# Patient Record
Sex: MALE | Race: WHITE | NOT HISPANIC OR LATINO | URBAN - METROPOLITAN AREA
[De-identification: names, ages, dates, MRNs, and addresses within clinical notes are randomized per-mention and may not be internally consistent; named-entity substitution may affect disease eponyms.]

---

## 2017-06-30 NOTE — PATIENT DISCUSSION
okay to shoot pool, 6m-1year to reach Max [place] potential.   Still no strenuous activity and no swimming x 1 week.  Keep bracelt on till bubble gone.  No flying with bubble.  If driving be jose rafael careful due to depth perception off.

## 2017-07-14 NOTE — PATIENT DISCUSSION
okay to go on [address] but pt aware of risk of redetatchment with recent rd, no sign of PVR right now.   No new RT/RD.

## 2017-07-14 NOTE — PATIENT DISCUSSION
okay to shoot pool, 6m-1year to reach Max [place] potential.   okay to resume normal activity but avoid eye trauma.  Bubble gone. If driving be very careful due to depth perception off.

## 2017-08-01 NOTE — PATIENT DISCUSSION
Stressed most VA improvement first 2 months, 6m-1yr to reach maximum VA potential. [place] will never be what it was prior to detachment. Ok to swim now. Any position ok now bubble gone.

## 2017-08-01 NOTE — PATIENT DISCUSSION
okay to go on [address] but pt aware of risk of redetatchment with recent rd, no sign of PVR right now.  No scar tissue seen on exam.

## 2017-10-17 NOTE — PATIENT DISCUSSION
"rec follow with [address] [facility], pt concerned about IOP states ""too high for him"". "

## 2017-10-25 ENCOUNTER — IMPORTED ENCOUNTER (OUTPATIENT)
Dept: URBAN - METROPOLITAN AREA CLINIC 9 | Facility: CLINIC | Age: 72
End: 2017-10-25

## 2017-10-30 NOTE — PATIENT DISCUSSION
"rec follow with [address] [facility], pt concerned about IOP states ""too high for him"". "

## 2017-11-14 NOTE — PATIENT DISCUSSION
See #2, pt IOP alittle high form advanced glc, rec see [address] [facility].

## 2017-11-14 NOTE — PATIENT DISCUSSION
"rec follow with [address] [facility], pt concerned about IOP states ""too high for him"". "

## 2017-11-22 NOTE — PROCEDURE NOTE: SURGICAL
Prior to commencing surgery patient identification, surgical procedure, site, and side were confirmed by Dr. Dara Roman. Following topical proparacaine anesthesia, the patient was positioned at the YAG laser, a contact lens coupled to the cornea with methylcellulose and an axial posterior capsulotomy performed without complication using 2.9 Mj x 36. Excess methylcellulose was washed from the eye, one drop of Alphagan was instilled and the patient returned to the holding area having tolerated the procedure well and without complication. <br />

## 2018-01-29 NOTE — PATIENT DISCUSSION
Pt having issues with getting Travatan. Recc switching to Latanoprost QHS OU, written Rx given to patient and follow up with Dr. Elisa Murillo as scheduled for IOP check.

## 2018-01-29 NOTE — PATIENT DISCUSSION
Stressed most VA improvement first 2 months, 6m-1yr to reach maximum VA potential. [place] will never be what it was prior to detachment.

## 2018-01-29 NOTE — PATIENT DISCUSSION
Pt edu stable. No change from previous visit. Recc continue with Preservision AREDS 2. Will follow up in 6 months. Call with any VA changes.

## 2018-02-13 NOTE — PATIENT DISCUSSION
Stressed most VA improvement first 2 months, 6m-1yr to reach maximum VA potential. [address] will never be what it was prior to detachment.

## 2018-07-23 NOTE — PATIENT DISCUSSION
Stable, no new RT/RD's seen on exam. S/S of RT/RD discussed in detail. Advised to call with any vision changes.

## 2018-07-23 NOTE — PATIENT DISCUSSION
Pressure slightly elevated in OS,need to keep IOP on the lower end, recommend seeing [address] this week and not waiting till Sept.

## 2018-07-27 NOTE — PATIENT DISCUSSION
IOP good on exam today. IOP is fluctuating. Recommend adding Rhopressa qhs OU. Continue Simbrinza and Cynthia Corby.

## 2018-08-24 NOTE — PATIENT DISCUSSION
Discussed trab has healed and not getting effect from the surgery. IOP is too high. Recommend removing scar tissue OS to reduce pressure. Discussed risks and benefits of surgery. Discussed no heavy lifting after surgery. Pt chooses to proceed with surgery.

## 2018-08-24 NOTE — PATIENT DISCUSSION
** 08/24/2018 secondary to insurance coverage pt will be using Ciprofloxacin vs Vigamox. Sent new script to [facility] in [address]

## 2018-09-10 NOTE — PATIENT DISCUSSION
HOLD SIMBRINZA AND VYZULTA IN OS. CONTINUE DUREZOL AND CIPROFLOXACIN IN OS AS INSTRUCTED. NO EYE RUBBING, SHIELD QHS, 24-7 E.R DOCTOR.

## 2018-10-25 ENCOUNTER — IMPORTED ENCOUNTER (OUTPATIENT)
Dept: URBAN - METROPOLITAN AREA CLINIC 9 | Facility: CLINIC | Age: 73
End: 2018-10-25

## 2019-05-17 NOTE — PATIENT DISCUSSION
Recommended artificial tears and warm compresses. AT gel Qhs for 3 nights and AT Q1-2 hours for 2 days.

## 2019-06-17 NOTE — PATIENT DISCUSSION
eyelid inflammation and CAREY likely cause of symptoms.  rec A.T use and W/C f/u with Optometry in 1-2 weeks.

## 2019-09-16 NOTE — PATIENT DISCUSSION
Pt states he hit his eye when putting on his readers.  Advised no abrasion noted, rec pres. Free A.T. throughout the day today, should feel better tomorrow if not see and O. D.

## 2019-12-06 NOTE — PATIENT DISCUSSION
IOP ABOVE TARGET OD AND PT BECOMING INTOLERANT TO GTTS OPTION OF TRYING SLT BUT MOST LIKELY WILL NEED GLC SX . WILL SCHEDULE SLT OD.

## 2019-12-12 NOTE — PROCEDURE NOTE: CLINICAL
PROCEDURE NOTE: SLT OD. Diagnosis: POAG, Severe. Prior to treatment, risks/benefits/alternatives discussed including infection, loss of vision, hemorrhage, cataract, glaucoma, retinal tears or detachment. Lens:  SLT laser lens with goniosol. Power: 1.4mJ. Total applications: 646. Application 380 degrees. Patient tolerated procedure well. There were no complications. Post-op instructions given. Post-op IOP = * mmHg. Neftaly Torres

## 2019-12-12 NOTE — PATIENT DISCUSSION
IOP ABOVE TARGET OD AND PT BECOMING INTOLERANT TO GTTS OPTION OF TRYING SLT BUT MOST LIKELY WILL NEED GLC SX . PLAN: SLT OD TODAY. OK TO SEE [facility] FOR IOP AFTER SLT SINCE PT ALREADY HAS AN APPT.

## 2020-01-20 NOTE — PATIENT DISCUSSION
Doing well, no RT/RD seen on exam. Advised pt that will have some visual disturbances due to Mac Off RD.

## 2020-01-20 NOTE — PATIENT DISCUSSION
IOP still slightly elevated OD since SLT. Recommend f/u with [address] [facility] in 1-2 months since better seeing eye. Continue with present meds as directed.

## 2020-03-06 NOTE — PATIENT DISCUSSION
IOP better OD.  Discussed likely will need glc surgery, Xen vs Trab, in the future OD.  Pt wishes to defer at this time.  Will continue glc gtts and monitor.

## 2020-06-12 NOTE — PATIENT DISCUSSION
If pt decides to get cortisone shots vs gel with PCP he will call and schedule an IOP check with a CFS OD for a month after getting injection.

## 2020-09-24 ENCOUNTER — PREPPED CHART (OUTPATIENT)
Dept: URBAN - METROPOLITAN AREA CLINIC 17 | Facility: CLINIC | Age: 75
End: 2020-09-24

## 2020-09-24 ENCOUNTER — IMPORTED ENCOUNTER (OUTPATIENT)
Dept: URBAN - METROPOLITAN AREA CLINIC 9 | Facility: CLINIC | Age: 75
End: 2020-09-24

## 2020-09-24 PROBLEM — E11.9: Noted: 2020-09-24

## 2020-09-24 PROBLEM — H25.13: Noted: 2020-09-24

## 2020-09-24 PROBLEM — D31.32: Noted: 2020-09-24

## 2020-09-24 PROBLEM — H43.811: Noted: 2020-09-24

## 2020-10-16 NOTE — PATIENT DISCUSSION
DISC PHOTOS LIKELY STABLE OU.  IOP FLUCTUATING LAST FEW VISITS.  WILL PLAN HVF EVERY OTHER VISIT.  DISCUSSED POSSIBILE NEED FOR GLC SURGERY OD IN THE FUTURE.

## 2021-02-19 NOTE — PATIENT DISCUSSION
[facility] 10-2 WHITE NEW BASELINE TODAY.  DISCUSSED MAY NEED ADDITIONAL GLC SURGERY IN THE FUTURE.  CURRENTLY WORKING ON GETTING APPROVAL FOR RHOPRESSA.  CONTINUE CURRENT MANAGEMENT.

## 2021-07-26 NOTE — PATIENT DISCUSSION
Doing well, no new RT/RD seen on exam. Advised pt that will have some visual disturbances due to Mac Off RD.

## 2021-07-26 NOTE — PATIENT DISCUSSION
AMD remains persistent but without progression. Continue with Amsler grid and consider AREDS 2 if progresses. Avoid direct or indirect smoking. The possibility of progression was discussed.

## 2021-10-18 ASSESSMENT — VISUAL ACUITY
OD_SC: 20/20 - SN
OS_SC: 20/20 SN
OS_SC: 20/20 - SN
OD_SC: 20/20 - SN
OS_SC: 20/20 SN
OD_SC: 20/20 - SN

## 2021-10-18 ASSESSMENT — TONOMETRY
OS_IOP_MMHG: 18
OS_IOP_MMHG: 16
OD_IOP_MMHG: 18
OD_IOP_MMHG: 13
OD_IOP_MMHG: 20
OS_IOP_MMHG: 20

## 2021-10-19 ASSESSMENT — TONOMETRY
OD_IOP_MMHG: 13
OS_IOP_MMHG: 16

## 2021-10-19 ASSESSMENT — VISUAL ACUITY
OD_SC: 20/20-1
OS_SC: 20/20

## 2021-10-21 ENCOUNTER — ESTABLISHED PATIENT (OUTPATIENT)
Dept: URBAN - METROPOLITAN AREA CLINIC 17 | Facility: CLINIC | Age: 76
End: 2021-10-21

## 2021-10-21 DIAGNOSIS — D31.32: ICD-10-CM

## 2021-10-21 DIAGNOSIS — H43.811: ICD-10-CM

## 2021-10-21 PROCEDURE — 92014 COMPRE OPH EXAM EST PT 1/>: CPT

## 2021-10-21 PROCEDURE — 92134 CPTRZ OPH DX IMG PST SGM RTA: CPT

## 2021-10-21 PROCEDURE — 92250 FUNDUS PHOTOGRAPHY W/I&R: CPT

## 2021-10-21 ASSESSMENT — TONOMETRY
OS_IOP_MMHG: 15
OD_IOP_MMHG: 16

## 2021-10-21 ASSESSMENT — VISUAL ACUITY
OS_SC: 20/20
OD_SC: 20/25

## 2022-06-24 RX ORDER — GABAPENTIN 300 MG/1
1 CAPSULE ORAL
COMMUNITY

## 2022-06-24 RX ORDER — LOSARTAN POTASSIUM 100 MG/1
TABLET ORAL
COMMUNITY

## 2022-06-24 RX ORDER — ATORVASTATIN CALCIUM 40 MG/1
TABLET, FILM COATED ORAL
COMMUNITY

## 2022-06-24 RX ORDER — METOLAZONE 2.5 MG/1
TABLET ORAL
COMMUNITY

## 2022-06-24 RX ORDER — GLIMEPIRIDE 1 MG/1
TABLET ORAL
COMMUNITY

## 2022-06-24 RX ORDER — METOPROLOL TARTRATE 100 MG/1
TABLET ORAL
COMMUNITY

## 2022-06-24 RX ORDER — LEVOTHYROXINE SODIUM 0.1 MG/1
TABLET ORAL
COMMUNITY

## 2022-06-24 RX ORDER — FUROSEMIDE 40 MG/1
TABLET ORAL
COMMUNITY

## 2022-06-24 RX ORDER — POTASSIUM CHLORIDE 20 MEQ/1
TABLET, EXTENDED RELEASE ORAL
COMMUNITY

## 2022-08-01 NOTE — PATIENT DISCUSSION
Doing well, no new RT/RD seen on exam. LOW VISION SECONDARY TO ADVANCED GLAUCOMA. Advised pt that will have some visual disturbances due to Mac Off RD.

## 2022-08-01 NOTE — PATIENT DISCUSSION
DISCUSSED THE DX, IMAGES, PROGNOSIS AND TX PLAN. PT REQUIRES MONITORING, RISK OF VI: MOD/HIGH. ALL QUESTIONS WERE ANSWERED.

## 2022-08-01 NOTE — PATIENT DISCUSSION
Continue with Amsler grid and consider AREDS 2 if progresses. Avoid direct or indirect smoking. The possibility of progression was discussed.

## 2022-08-26 NOTE — PATIENT DISCUSSION
WILL REPEAT 10-2 VF OD ONLY WITH IOP CHECK OU. WILL SCHEDULE AN AFTERNOON APPT. IF STILL WORSENING TO DISCUSS OPTIONS.

## 2022-10-12 NOTE — PATIENT DISCUSSION
GOOD POSTOPERATIVE APPEARANCE.  CONTINUE PRED AND MOXI QID.  (OK PER JWK TO USE PRED VS DUREZOL AS STEROID RESPONDER AND KELOID FORMER).  RESTART SIMBRINZA BID OD.  NO EYE RUBBING, SHIELD QHS.  BCL PLACED.

## 2022-10-20 NOTE — PATIENT DISCUSSION
GOOD POSTOPERATIVE APPEARANCE.  CONTINUE PRED AND MOXI QID.  (OK PER JWK TO USE PRED VS DUREZOL AS STEROID RESPONDER AND KELOID FORMER). ADD VYZULTA QHS OD.  NO EYE RUBBING, SHIELD QHS.  BCL PLACED.

## 2022-10-20 NOTE — PATIENT DISCUSSION
In one week pt will return for +/- LSL.  Add Vyzulta OD qhs until next visit. No simbrinza AM of next appt no vyzulta qhs the night before next appt.

## 2022-10-27 NOTE — PROCEDURE NOTE: CLINICAL
PROCEDURE NOTE: Laser Suture Lysis #1 OD. Diagnosis: POAG, Severe. Prior to treatment, risks/benefits/alternatives discussed including infection, loss of vision, hemorrhage, cataract, glaucoma, retinal tears or detachment. Lens used: [place]. Pulse power: 500-700 mW. Spot size: 50 um. Duration: 0.01 sec. There were no complications. Patient tolerated procedure well. Post procedure instructions given. Makayla Vizcarra

## 2022-11-04 NOTE — PATIENT DISCUSSION
IOP TOO HIGH OD TODAY.  TOO SOON FOR LSL #2. START ALPHAGAN BID OD.  ADVISED NOT TO USE DAY OF NEXT APPT.

## 2022-11-17 NOTE — PATIENT DISCUSSION
IOP TOO HIGH OD TODAY.  TOO SOON FOR LSL # 2. HOLD ALPHAGAN, USE SIMBRINZA OD BID. CONTINUE PRED QID AND MOXI TID. BCL REPLACED. NO EYE RUBBING, SHIELD QHS. 24-7 E.R DOCTOR.

## 2022-12-01 NOTE — PROCEDURE NOTE: CLINICAL
PROCEDURE NOTE: Laser Suture Lysis #2 OD. Diagnosis: POAG, Severe. Prior to treatment, risks/benefits/alternatives discussed including infection, loss of vision, hemorrhage, cataract, glaucoma, retinal tears or detachment. Lens used: [place]. Pulse power: 500-700 mW. Spot size: 50 um. Duration: 0.01 sec. There were no complications. Patient tolerated procedure well. Post procedure instructions given. Sean Tavares PROCEDURE NOTE: Bleb Needling OD. Diagnosis: POAG, Severe. Prior to treatment, risks/benefits/alternatives discussed including infection, loss of vision, hemorrhage, cataract, glaucoma, retinal tears or detachment. The patient was prepped in the usual sterile fashion. A lid speculum was placed in the operative eye. Topical Tertacaine and subconjuctival Lidocaine were administered. A 27 gauge needle was used to break up subconjunctival scar tissue and to reform the bleb. The wound was inspected and found to be water-tight. Patient tolerated procedure well. There were no complications. Post procedure instructions given. Sean Tavares

## 2022-12-01 NOTE — PATIENT DISCUSSION
IOP TOO HIGH.  REC:  LSL #2 TODAY.  ALSO DID MASSAGE AND TCNR.  TA AFTER = 06.  REFLUX BLOOD AFTER LIFTING FLAP AND REMOVING SCAR TISSUE.  ADVISED VISION WILL BE BLURRED AND DECREASED WHILE BLOOD ABSORBS.  STRESSED NO HEAVY LIFTING, WEAR SHIELD QHS, NO EYE RUBBING/SQUEEZING.  2 GTTS ATROPINE GIVEN IN OFC TODAY.  HOLD SIMBRINZA AND MOXI.   DECREASE PRED TO BID AND START ATROPINE BID.

## 2022-12-01 NOTE — PROCEDURE NOTE: CLINICAL
PROCEDURE NOTE: Laser Suture Lysis #2 OD. Diagnosis: POAG, Severe. Prior to treatment, risks/benefits/alternatives discussed including infection, loss of vision, hemorrhage, cataract, glaucoma, retinal tears or detachment. Lens used: [place]. Pulse power: 500-700 mW. Spot size: 50 um. Duration: 0.01 sec. There were no complications. Patient tolerated procedure well. Post procedure instructions given. Sean Tavares PROCEDURE NOTE: Bleb Needling OD. Diagnosis: POAG, Severe. Prior to treatment, risks/benefits/alternatives discussed including infection, loss of vision, hemorrhage, cataract, glaucoma, retinal tears or detachment. The patient was prepped in the usual sterile fashion. A lid speculum was placed in the operative eye. Topical Tertacaine and subconjuctival Lidocaine were administered. A 27 gauge needle was used to break up subconjunctival scar tissue and to reform the bleb. The wound was inspected and found to be water-tight. Patient tolerated procedure well. There were no complications. Post procedure instructions given. Sean Tavares

## 2022-12-08 NOTE — PATIENT DISCUSSION
Decrease  Pred Acetate to bid OD. Stressed importance of physical limitations which were reviewed and no eye rubbing. Pt understands that the swelling  will take some time to resolve. If pt experiences further pain he is to call asap.

## 2022-12-22 NOTE — PATIENT DISCUSSION
CHOROIDALS RESOLVED OD.  HOLD ATROPINE.  CONTINUE PRED BID.  NO EYE RUBBING. What Type Of Note Output Would You Prefer (Optional)?: Standard Output How Severe Is Your Skin Lesion?: mild Has Your Skin Lesion Been Treated?: not been treated Is This A New Presentation, Or A Follow-Up?: Skin Lesion

## 2023-12-30 NOTE — PATIENT DISCUSSION
Doing well, retina attached, good IOP with no signs of endophthalmitis.
Recommend AREDS 2 multivitamin supplements, info given on AREDS 2 preservision w/ MVT formula.
Recommended observation.
Stable.
Stressed most VA improvement first 2 months, 6m-1yr to reach maximum VA potential. [place] will never be what it was prior to detachment. Ok to swim now. Any position ok now bubble gone.
Yag OD as scheduled.
follow with [facility].
rec follow with [facility].
scheduled for YAG on Wed 11/22/17.
grandson

## 2024-01-20 NOTE — PATIENT DISCUSSION
Maxitrol sugar Qhs OU for 2 weeks. Photo Preface (Leave Blank If You Do Not Want): Photographs were obtained today Detail Level: Zone

## 2025-06-16 ENCOUNTER — NEW PATIENT (OUTPATIENT)
Age: 80
End: 2025-06-16

## 2025-06-16 DIAGNOSIS — H35.372: ICD-10-CM

## 2025-06-16 DIAGNOSIS — D31.32: ICD-10-CM

## 2025-06-16 DIAGNOSIS — E11.9: ICD-10-CM

## 2025-06-16 PROCEDURE — 92134 CPTRZ OPH DX IMG PST SGM RTA: CPT

## 2025-06-16 PROCEDURE — 92015 DETERMINE REFRACTIVE STATE: CPT

## 2025-06-16 PROCEDURE — 99204 OFFICE O/P NEW MOD 45 MIN: CPT

## 2025-06-20 ENCOUNTER — COMPREHENSIVE EXAM (OUTPATIENT)
Age: 80
End: 2025-06-20

## 2025-07-08 ENCOUNTER — COMPREHENSIVE EXAM (OUTPATIENT)
Age: 80
End: 2025-07-08

## 2025-07-08 DIAGNOSIS — E11.9: ICD-10-CM

## 2025-07-08 DIAGNOSIS — D31.32: ICD-10-CM

## 2025-07-08 DIAGNOSIS — H35.372: ICD-10-CM

## 2025-07-08 DIAGNOSIS — H04.123: ICD-10-CM

## 2025-07-08 DIAGNOSIS — H43.811: ICD-10-CM

## 2025-07-08 DIAGNOSIS — H53.2: ICD-10-CM

## 2025-07-08 DIAGNOSIS — H25.13: ICD-10-CM

## 2025-07-08 PROCEDURE — 92250 FUNDUS PHOTOGRAPHY W/I&R: CPT

## 2025-07-08 PROCEDURE — 92014 COMPRE OPH EXAM EST PT 1/>: CPT
